# Patient Record
Sex: FEMALE | Race: WHITE | NOT HISPANIC OR LATINO | Employment: FULL TIME | URBAN - METROPOLITAN AREA
[De-identification: names, ages, dates, MRNs, and addresses within clinical notes are randomized per-mention and may not be internally consistent; named-entity substitution may affect disease eponyms.]

---

## 2017-12-11 LAB
INFLUENZA A ANTIGEN, POC: POSITIVE
INFLUENZA B ANTIGEN, POC: NEGATIVE

## 2018-05-08 LAB — RAPID GROUP A STREP (OHS): POSITIVE

## 2018-07-24 ENCOUNTER — HISTORICAL (OUTPATIENT)
Dept: ADMINISTRATIVE | Facility: HOSPITAL | Age: 48
End: 2018-07-24

## 2018-07-24 LAB
ABS NEUT (OLG): 4.3
ALBUMIN SERPL-MCNC: 4 GM/DL (ref 3.4–5)
ALBUMIN/GLOB SERPL: 1.54 {RATIO} (ref 1.5–2.5)
ALP SERPL-CCNC: 52 UNIT/L (ref 38–126)
ALT SERPL-CCNC: 24 UNIT/L (ref 7–52)
AST SERPL-CCNC: 20 UNIT/L (ref 15–37)
BILIRUB SERPL-MCNC: 0.5 MG/DL (ref 0.2–1)
BILIRUBIN DIRECT+TOT PNL SERPL-MCNC: 0.1 MG/DL (ref 0–0.5)
BILIRUBIN DIRECT+TOT PNL SERPL-MCNC: 0.4 MG/DL
BUN SERPL-MCNC: 14 MG/DL (ref 7–18)
CALCIUM SERPL-MCNC: 8.7 MG/DL (ref 8.5–10)
CHLORIDE SERPL-SCNC: 104 MMOL/L (ref 98–107)
CHOLEST SERPL-MCNC: 214 MG/DL (ref 0–200)
CHOLEST/HDLC SERPL: 3.7 {RATIO}
CO2 SERPL-SCNC: 24 MMOL/L (ref 21–32)
CREAT SERPL-MCNC: 0.67 MG/DL (ref 0.6–1.3)
DEPRECATED CALCIDIOL+CALCIFEROL SERPL-MC: 41.2 NG/ML (ref 30–80)
ERYTHROCYTE [DISTWIDTH] IN BLOOD BY AUTOMATED COUNT: 12.6 % (ref 11.5–17)
GLOBULIN SER-MCNC: 2.6 GM/DL (ref 1.2–3)
GLUCOSE SERPL-MCNC: 105 MG/DL (ref 74–106)
HCT VFR BLD AUTO: 40.8 % (ref 37–47)
HDLC SERPL-MCNC: 58 MG/DL (ref 35–60)
HGB BLD-MCNC: 14 GM/DL (ref 12–16)
LDLC SERPL CALC-MCNC: 141 MG/DL (ref 0–129)
LYMPHOCYTES # BLD AUTO: 2 X10(3)/MCL (ref 0.6–3.4)
LYMPHOCYTES NFR BLD AUTO: 27.6 % (ref 13–40)
MCH RBC QN AUTO: 31.3 PG (ref 27–31.2)
MCHC RBC AUTO-ENTMCNC: 34 GM/DL (ref 32–36)
MCV RBC AUTO: 91 FL (ref 80–94)
MONOCYTES # BLD AUTO: 0.8 X10(3)/MCL (ref 0–1.8)
MONOCYTES NFR BLD AUTO: 11.7 % (ref 0.1–24)
NEUTROPHILS NFR BLD AUTO: 60.7 % (ref 47–80)
PLATELET # BLD AUTO: 222 X10(3)/MCL (ref 130–400)
PMV BLD AUTO: 9.8 FL
POTASSIUM SERPL-SCNC: 4.4 MMOL/L (ref 3.5–5.1)
PROT SERPL-MCNC: 6.6 GM/DL (ref 6.4–8.2)
RBC # BLD AUTO: 4.48 X10(6)/MCL (ref 4.2–5.4)
SODIUM SERPL-SCNC: 135 MMOL/L (ref 136–145)
TRIGL SERPL-MCNC: 134 MG/DL (ref 30–150)
TSH SERPL-ACNC: 1.6 MIU/ML (ref 0.35–4.94)
VLDLC SERPL CALC-MCNC: 26.8 MG/DL
WBC # SPEC AUTO: 7.1 X10(3)/MCL (ref 4.5–11.5)

## 2018-12-12 ENCOUNTER — HISTORICAL (OUTPATIENT)
Dept: ADMINISTRATIVE | Facility: HOSPITAL | Age: 48
End: 2018-12-12

## 2018-12-12 LAB
FLUAV AG NPH QL IA: NEGATIVE
FLUBV AG NPH QL IA: NEGATIVE

## 2019-10-07 ENCOUNTER — HISTORICAL (OUTPATIENT)
Dept: ADMINISTRATIVE | Facility: HOSPITAL | Age: 49
End: 2019-10-07

## 2019-10-07 LAB
ABS NEUT (OLG): 5.8 X10(3)/MCL (ref 2.1–9.2)
ALBUMIN SERPL-MCNC: 3.9 GM/DL (ref 3.4–5)
ALBUMIN/GLOB SERPL: 1.3 {RATIO} (ref 1.5–2.5)
ALP SERPL-CCNC: 57 UNIT/L (ref 38–126)
ALT SERPL-CCNC: 20 UNIT/L (ref 7–52)
AST SERPL-CCNC: 22 UNIT/L (ref 15–37)
BILIRUB SERPL-MCNC: 0.3 MG/DL (ref 0.2–1)
BILIRUBIN DIRECT+TOT PNL SERPL-MCNC: 0.1 MG/DL (ref 0–0.5)
BILIRUBIN DIRECT+TOT PNL SERPL-MCNC: 0.2 MG/DL
BUN SERPL-MCNC: 12 MG/DL (ref 7–18)
CALCIUM SERPL-MCNC: 8.7 MG/DL (ref 8.5–10)
CHLORIDE SERPL-SCNC: 105 MMOL/L (ref 98–107)
CO2 SERPL-SCNC: 23 MMOL/L (ref 21–32)
CREAT SERPL-MCNC: 0.69 MG/DL (ref 0.6–1.3)
ERYTHROCYTE [DISTWIDTH] IN BLOOD BY AUTOMATED COUNT: 13.2 % (ref 11.5–17)
GLOBULIN SER-MCNC: 3 GM/DL (ref 1.2–3)
GLUCOSE SERPL-MCNC: 157 MG/DL (ref 74–106)
HCT VFR BLD AUTO: 37.6 % (ref 37–47)
HGB BLD-MCNC: 12.8 GM/DL (ref 12–16)
LYMPHOCYTES # BLD AUTO: 2.1 X10(3)/MCL (ref 0.6–3.4)
LYMPHOCYTES NFR BLD AUTO: 23.4 % (ref 13–40)
MCH RBC QN AUTO: 29 PG (ref 27–31.2)
MCHC RBC AUTO-ENTMCNC: 34 GM/DL (ref 32–36)
MCV RBC AUTO: 85 FL (ref 80–94)
MONOCYTES # BLD AUTO: 1.1 X10(3)/MCL (ref 0.1–1.3)
MONOCYTES NFR BLD AUTO: 12.5 % (ref 0.1–24)
NEUTROPHILS NFR BLD AUTO: 64.1 % (ref 47–80)
PLATELET # BLD AUTO: 270 X10(3)/MCL (ref 130–400)
PMV BLD AUTO: 9.2 FL (ref 9.4–12.4)
POTASSIUM SERPL-SCNC: 4.2 MMOL/L (ref 3.5–5.1)
PROT SERPL-MCNC: 6.9 GM/DL (ref 6.4–8.2)
RBC # BLD AUTO: 4.41 X10(6)/MCL (ref 4.2–5.4)
SODIUM SERPL-SCNC: 137 MMOL/L (ref 136–145)
TSH SERPL-ACNC: 0.87 MIU/ML (ref 0.35–4.94)
WBC # SPEC AUTO: 9 X10(3)/MCL (ref 4.5–11.5)

## 2019-10-28 ENCOUNTER — HISTORICAL (OUTPATIENT)
Dept: ADMINISTRATIVE | Facility: HOSPITAL | Age: 49
End: 2019-10-28

## 2021-03-01 LAB — SARS-COV-2 RNA RESP QL NAA+PROBE: NEGATIVE

## 2021-06-23 ENCOUNTER — HISTORICAL (OUTPATIENT)
Dept: ADMINISTRATIVE | Facility: HOSPITAL | Age: 51
End: 2021-06-23

## 2021-06-23 LAB
ABS NEUT (OLG): 5 X10(3)/MCL (ref 2.1–9.2)
ALBUMIN SERPL-MCNC: 4.2 GM/DL (ref 3.4–5)
ALBUMIN/GLOB SERPL: 1.45 {RATIO} (ref 1.5–2.5)
ALP SERPL-CCNC: 62 UNIT/L (ref 38–126)
ALT SERPL-CCNC: 22 UNIT/L (ref 7–52)
AST SERPL-CCNC: 26 UNIT/L (ref 15–37)
BILIRUB SERPL-MCNC: 0.5 MG/DL (ref 0.2–1)
BILIRUBIN DIRECT+TOT PNL SERPL-MCNC: 0.1 MG/DL (ref 0–0.5)
BILIRUBIN DIRECT+TOT PNL SERPL-MCNC: 0.4 MG/DL
BUN SERPL-MCNC: 12 MG/DL (ref 7–18)
CALCIUM SERPL-MCNC: 9.3 MG/DL (ref 8.5–10)
CHLORIDE SERPL-SCNC: 104 MMOL/L (ref 98–107)
CHOLEST SERPL-MCNC: 228 MG/DL (ref 0–200)
CHOLEST/HDLC SERPL: 4.1 {RATIO}
CO2 SERPL-SCNC: 26 MMOL/L (ref 21–32)
CREAT SERPL-MCNC: 0.69 MG/DL (ref 0.6–1.3)
CRP SERPL HS-MCNC: 1.26 MG/DL
DEPRECATED CALCIDIOL+CALCIFEROL SERPL-MC: 30.4 NG/ML (ref 30–80)
ERYTHROCYTE [DISTWIDTH] IN BLOOD BY AUTOMATED COUNT: 12.7 % (ref 11.5–17)
ERYTHROCYTE [SEDIMENTATION RATE] IN BLOOD: 11 MM/HR (ref 0–20)
GLOBULIN SER-MCNC: 2.9 GM/DL (ref 1.2–3)
GLUCOSE SERPL-MCNC: 99 MG/DL (ref 74–106)
HCT VFR BLD AUTO: 40.9 % (ref 37–47)
HDLC SERPL-MCNC: 55 MG/DL (ref 35–60)
HGB BLD-MCNC: 13.9 GM/DL (ref 12–16)
LDLC SERPL CALC-MCNC: 165 MG/DL (ref 0–129)
LYMPHOCYTES # BLD AUTO: 2.1 X10(3)/MCL (ref 0.6–3.4)
LYMPHOCYTES NFR BLD AUTO: 26.9 % (ref 13–40)
MCH RBC QN AUTO: 29.4 PG (ref 27–31.2)
MCHC RBC AUTO-ENTMCNC: 34 GM/DL (ref 32–36)
MCV RBC AUTO: 87 FL (ref 80–94)
MONOCYTES # BLD AUTO: 0.8 X10(3)/MCL (ref 0.1–1.3)
MONOCYTES NFR BLD AUTO: 10.5 % (ref 0.1–24)
NEUTROPHILS NFR BLD AUTO: 62.6 % (ref 47–80)
PLATELET # BLD AUTO: 248 X10(3)/MCL (ref 130–400)
PMV BLD AUTO: 9.5 FL (ref 9.4–12.4)
POTASSIUM SERPL-SCNC: 4.6 MMOL/L (ref 3.5–5.1)
PROT SERPL-MCNC: 7.1 GM/DL (ref 6.4–8.2)
RBC # BLD AUTO: 4.72 X10(6)/MCL (ref 4.2–5.4)
RHEUMATOID FACT SERPL-ACNC: <13 IU/ML
SODIUM SERPL-SCNC: 139 MMOL/L (ref 136–145)
TRIGL SERPL-MCNC: 130 MG/DL (ref 30–150)
TSH SERPL-ACNC: 1.04 MIU/ML (ref 0.35–4.94)
VLDLC SERPL CALC-MCNC: 26 MG/DL
WBC # SPEC AUTO: 7.9 X10(3)/MCL (ref 4.5–11.5)

## 2021-12-11 ENCOUNTER — HISTORICAL (OUTPATIENT)
Dept: ADMINISTRATIVE | Facility: HOSPITAL | Age: 51
End: 2021-12-11

## 2021-12-11 LAB — SARS-COV-2 RNA RESP QL NAA+PROBE: NEGATIVE

## 2021-12-22 ENCOUNTER — HISTORICAL (OUTPATIENT)
Dept: ADMINISTRATIVE | Facility: HOSPITAL | Age: 51
End: 2021-12-22

## 2021-12-22 LAB
ABS NEUT (OLG): 4.74 X10(3)/MCL (ref 2.1–9.2)
BASOPHILS # BLD AUTO: 0 X10(3)/MCL (ref 0–0.2)
BASOPHILS NFR BLD AUTO: 0 %
EOSINOPHIL # BLD AUTO: 0.2 X10(3)/MCL (ref 0–0.9)
EOSINOPHIL NFR BLD AUTO: 2 %
ERYTHROCYTE [DISTWIDTH] IN BLOOD BY AUTOMATED COUNT: 13.3 % (ref 11.5–17)
HCT VFR BLD AUTO: 41.2 % (ref 37–47)
HGB BLD-MCNC: 13.6 GM/DL (ref 12–16)
LYMPHOCYTES # BLD AUTO: 2.6 X10(3)/MCL (ref 0.6–4.6)
LYMPHOCYTES NFR BLD AUTO: 31 %
MCH RBC QN AUTO: 29 PG (ref 27–31)
MCHC RBC AUTO-ENTMCNC: 33 GM/DL (ref 33–36)
MCV RBC AUTO: 87.8 FL (ref 80–94)
MONOCYTES # BLD AUTO: 0.7 X10(3)/MCL (ref 0.1–1.3)
MONOCYTES NFR BLD AUTO: 8 %
NEUTROPHILS # BLD AUTO: 4.74 X10(3)/MCL (ref 2.1–9.2)
NEUTROPHILS NFR BLD AUTO: 57 %
PLATELET # BLD AUTO: 261 X10(3)/MCL (ref 130–400)
PMV BLD AUTO: 10 FL (ref 9.4–12.4)
RBC # BLD AUTO: 4.69 X10(6)/MCL (ref 4.2–5.4)
WBC # SPEC AUTO: 8.3 X10(3)/MCL (ref 4.5–11.5)

## 2022-01-24 LAB
HUMAN PAPILLOMAVIRUS (HPV): NORMAL
HUMAN PAPILLOMAVIRUS (HPV): NORMAL
PAP RECOMMENDATION EXT: NORMAL
PAP SMEAR: NORMAL

## 2022-04-10 ENCOUNTER — HISTORICAL (OUTPATIENT)
Dept: ADMINISTRATIVE | Facility: HOSPITAL | Age: 52
End: 2022-04-10
Payer: COMMERCIAL

## 2022-04-25 VITALS
OXYGEN SATURATION: 96 % | WEIGHT: 150 LBS | HEIGHT: 64 IN | SYSTOLIC BLOOD PRESSURE: 131 MMHG | BODY MASS INDEX: 25.61 KG/M2 | DIASTOLIC BLOOD PRESSURE: 84 MMHG

## 2022-05-03 NOTE — HISTORICAL [facility] CERNER
This is a historical note converted from Kaylen. Formatting and pictures may have been removed.  Please reference Kaylen for original formatting and attached multimedia. Chief Complaint  WELLNESS VISIT  History of Present Illness  48?year old female presents for wellness visit.  Blood Pressure - 114/72  BMI - 26.01  Immunizations -?up to date  Breast Cancer Screening - up to date, Dr. Hardy  Cervical Cancer Screening -?up to date, Dr. Shelton  Diet -?Balanced  Exercise - walking 3 miles 4 times a week  Nonsmoker  Hx of osteopenia. ?Last?DEXA scan 5 years ago.? Strong family history of osteoporosis.  Patient returned from the beach this past weekend. ?Noted?on Sunday pruritic?red?nodule?bilateral posterior arms. ?Some improvement with over-the-counter hydrocortisone.  Review of Systems  Constitutional:?No weight loss, no fever, no fatigue, no chills, no night sweats,?no weakness  Eyes:?No blurred vision,?no redness,?no drainage,?no ocular?pain  HEENT:?No sore throat,?no ear pain, no sinus pressure, no nasal congestion, no rhinorrhea, no postnasal drip  Respiratory:?No cough, no wheezing, no sputum production, no shortness of breath  Cardiovascular:?No chest pain, no palpitations, no dyspnea on exertion,?no orthopnea  Gastrointestinal:?No nausea, no vomiting, no abdominal pain, no diarrhea,?no constipation, no melena,?no hematochezia  Genitourinary:?No dysuria, no hematuria, no frequency, no urgency, no incontinence, no discharge  Musculoskeletal:?No myalgias, no arthralgias, no weakness, no joint effusion, no edema  Integumentary:?rashes, no hives, itching, no lesions, no jaundice  Neurologic:?No headaches, no numbness, no tingling, no weakness, no dizziness  Psychiatric:?No anxiety, no irritability, no depression,?no suicidal ideations, no?homicidal ideations,?no delusions, no hallucinations  Endocrine:?No polyuria, no polydipsia, no polyphagia  Hematology:?No bruising, no lymphadenopathy,?no  paleness  ?  Physical Exam  Vitals & Measurements  HR:?68(Peripheral)? BP:?114/72?  HT:?165?cm? HT:?165?cm? WT:?70.8?kg? WT:?70.8?kg? BMI:?26.01?  General:?Well developed, Well-nourished, in No acute distress, A&O x 4  Eye:?PERRLA, EOMI, Clear conjunctiva, Eyelids unremarkable  Ears:?Bilateral EAC clear?and?Bilateral TM clear  Nose:? Mucosa normal, No rhinorrhea, No lesions  O/P:??No?erythema,?No tonsillar hypertrophy,?No tonsillar exudates,?No cobblestoning  Neck:?Soft, Nontender, No thyromegaly, Full range of motion, No cervical lymphadenopathy, No JVD  Cardiovascular:?Regular rate and rhythm, No murmurs, No gallops, No rubs  Respiratory:?Clear to auscultation bilaterally, No wheezes, No rhonchi, No?crackles  Abdomen:? Soft, Nontender, No hepatosplenomegaly, Normal and equal bowel sounds, No masses, No rebound, No guarding  Musculoskeletal:? No tenderness, FROM, No joint abnormality, No clubbing, No cyanosis,No?edema  Neurologic:? No motor or sensory deficits, Reflexes 2+ throughout, CN II-XII grossly intact  Integumentary:?Erythematous nodule bilateral posterior upper arm  ?  Assessment/Plan  1.?Wellness examination  ?CBC, CMP, FLP, TSH  Discussed importance of maintaining balanced diet and regular exercise  Ordered:  Preventative Health Care Est 40-64 years [id number] PC, Wellness examination, [facility], 07/24/18 10:29:00 CDT  Thyroid Stimulating Hormone, Routine collect, 07/24/18 10:50:00 CDT, Blood, Stop date 07/24/18 10:50:00 CDT, Lab Collect, Wellness examination, 07/24/18 10:50:00 CDT  ?  2.?Osteopenia  Ordered:  Schedule Diagnostics Study, DEXA Scan, [facility], 07/24/18 10:29:00 CDT, Osteopenia  Vitamin D, 25-Hydroxy Level, Routine collect, 07/24/18 10:50:00 CDT, Blood, Stop date 07/24/18 10:50:00 CDT, Lab Collect, Osteopenia, 07/24/18 10:50:00 CDT  ?  3.?Insect bite of arm  ?Desonide 0.05% ointment applied twice daily to affected area  ?   Problem List/Past Medical  History  Ongoing  Osteopenia  Historical  No qualifying data  Procedure/Surgical History  cyst on ovary  deviated septum   Medications  ZENCHENT TABLETS 28, 1 tab(s), Oral, Daily  Allergies  Bactrim DS?(Hives)  Social History  Alcohol - Denies Alcohol Use, 10/12/2012  Current, Wine, 1-2 times per month, 12/11/2017  Substance Abuse - Denies Substance Abuse, 10/12/2012  Never, 12/11/2017  Tobacco - Denies Tobacco Use, 10/12/2012  Never smoker, 12/11/2017  Family History  CAD - Coronary artery disease: Father.  Diabetes mellitus type 2: Father.  Hypertension.: Father.  Osteoporosis: Mother, Sister, Sister and Sister.  Pneumonia.: Father.  Rheumatoid arthritis: Sister.  Health Maintenance  Health Maintenance  ???Pending?(in the next year)  ??? ??Due?  ??? ? ? ?Alcohol Misuse Screening due??07/24/18??and every 1??year(s)  ??? ? ? ?Depression Screening due??07/24/18??and every?  ??? ? ? ?Diabetes Screening due??07/24/18??and every?  ??? ? ? ?Influenza Vaccine due??07/24/18??and every?  ??? ? ? ?Tetanus Vaccine due??07/24/18??and every 10??year(s)  ???Satisfied?(in the past 1 year)  ??? ??Satisfied?  ??? ? ? ?Blood Pressure Screening on??07/24/18.??Satisfied by Nubia Rodriguez.  ??? ? ? ?Body Mass Index Check on??07/24/18.??Satisfied by Nubia Rodriguez.  ??? ? ? ?Cervical Cancer Screening on??01/05/18.??Satisfied by Shahana Durant  ??? ? ? ?Diabetes Screening on??07/24/18.??Satisfied by Guru Nunn  ??? ? ? ?Lipid Screening on??07/24/18.??Satisfied by Guru Nunn  ??? ? ? ?Obesity Screening on??07/24/18.??Satisfied by Nubia Rodriguez.  ?  ?

## 2022-05-03 NOTE — HISTORICAL [facility]
This is a historical note converted from Kaylen. Formatting and pictures may have been removed.  Please reference Kaylen for original formatting and attached multimedia. Chief Complaint  red dot in left eye, also sinus congestion and cough  History of Present Illness  Redness left eye on Thursday.? Denies blurred vision.? Associated with photosensitivity and retroorbital pressure.?  Saturday pt developed nasal?congestion, ear congestion, sinus pressure, post nasal drip.?  Pt reports increased fatigue over the last few months.? Pt does report intermittent awakenings at night.? Difficulty falling back asleep.?  Intermittent cough and wheezing every few weeks.? Improved with Proair.?  Worse in the evenings when pt lays down.  Review of Systems  Constitutional:?No weight loss, no fever, fatigue, no chills, no night sweats,?no weakness  Eyes:?No blurred vision,?no redness,?no drainage,?no ocular?pain  HEENT:?sore throat,?ear pain, sinus pressure, nasal congestion, rhinorrhea, postnasal drip  Respiratory:?No cough, no wheezing, no sputum production, no shortness of breath  Cardiovascular:?No chest pain, no.? No significant changes noted in MRI from previous palpitations, no dyspnea on exertion,?no orthopnea  Gastrointestinal:?No nausea, no vomiting, no abdominal pain, no diarrhea,?no constipation, no melena,?no hematochezia  Genitourinary:?No dysuria, no hematuria, no frequency, no urgency, no incontinence, no discharge  Musculoskeletal:?No myalgias, no arthralgias, no weakness, no joint effusion, no edema  Integumentary:?No rashes, no hives, no itching, no lesions, no jaundice  Neurologic:?No headaches, no numbness, no tingling, no weakness, no dizziness  Psychiatric:?No anxiety, no irritability, no depression,?no suicidal ideations, no?homicidal ideations,?no delusions, no hallucinations  Endocrine:?No polyuria, no polydipsia, no polyphagia  Hematology:?No bruising, no lymphadenopathy,?no paleness  ?  Physical  Exam  Vitals & Measurements  T:?36.8? ?C (Oral)? HR:?70(Peripheral)? BP:?118/78?  HT:?165?cm? WT:?73.4?kg? BMI:?26.96? LMP:?09/17/2019 00:00 CDT?  General:?Well developed, Well-nourished, in No acute distress, A&O x 4  Eye:?PERRLA, EOMI, pinpoint left?conjunctiva?hemorrhage, Eyelids unremarkable  Ears:?Bilateral EAC clear?and?Bilateral TM clear  Nose:? Mucosa edematous, rhinorrhea, No lesions  O/P:??No?erythema,?No tonsillar hypertrophy,?No tonsillar exudates,?cobblestoning  Neck:?Soft, Nontender, No thyromegaly, Full range of motion, No cervical lymphadenopathy, No JVD  Cardiovascular:?Regular rate and rhythm, No murmurs, No gallops, No rubs  Respiratory:?Clear to auscultation bilaterally, No wheezes, No rhonchi, No?crackles  Abdomen:? Soft, Nontender, No hepatosplenomegaly, Normal and equal bowel sounds, No masses, No rebound, No guarding  Musculoskeletal:? No tenderness, FROM, No joint abnormality, No clubbing, No cyanosis,No?edema  Neurologic:? No motor or sensory deficits, Reflexes 2+ throughout, CN II-XII grossly intact  Integumentary:?No rashes or skin lesions  ?  Assessment/Plan  1.?Chronic cough?R05  ?Allergy/immunology referral for further evaluation  Ordered:  External Referral, Chronic recurrent cough/Allergic Rhinitis, Allergy/Immunology, Dr. Roscoe Ferrara, 10/07/19 14:44:00 CDT, Chronic cough  Bronchospasm  Office/Outpatient Visit Level 4 Established [id number] PC, Chronic cough  Bronchospasm  Fatigue  Conjunctival hemorrhage, left eye  Acute rhinosinusitis, [facility], 10/07/19 14:41:00 CDT  ?  2.?Bronchospasm?J98.01  ?Continue Pro Air as needed  Allergy/immunology referral  Ordered:  External Referral, Chronic recurrent cough/Allergic Rhinitis, Allergy/Immunology, Dr. Roscoe Ferrara, 10/07/19 14:44:00 CDT, Chronic cough  Bronchospasm  Office/Outpatient Visit Level 4 Established [id number] PC, Chronic cough  Bronchospasm  Fatigue  Conjunctival hemorrhage, left eye  Acute rhinosinusitis, [facility]  [facility], 10/07/19 14:41:00 CDT  ?  3.?Fatigue?R53.83  ?CBC, CMP, TSH today  Ordered:  Comprehensive Metabolic Panel, Routine collect, 10/07/19 15:00:00 CDT, Blood, Stop date 10/07/19 15:00:00 CDT, Lab Collect, Fatigue, 10/07/19 15:00:00 CDT  Office/Outpatient Visit Level 4 Established [id number] PC, Chronic cough  Bronchospasm  Fatigue  Conjunctival hemorrhage, left eye  Acute rhinosinusitis, [facility], 10/07/19 14:41:00 CDT  ?  4.?Conjunctival hemorrhage, left eye?H11.32  ?Reassurance and observation  Ordered:  Office/Outpatient Visit Level 4 Established [id number] PC, Chronic cough  Bronchospasm  Fatigue  Conjunctival hemorrhage, left eye  Acute rhinosinusitis, [facility], 10/07/19 14:41:00 CDT  ?  5.?Acute rhinosinusitis?J01.90  ?Dymista 1 spray to each nostril twice daily  Ordered:  Office/Outpatient Visit Level 4 Established [id number] PC, Chronic cough  Bronchospasm  Fatigue  Conjunctival hemorrhage, left eye  Acute rhinosinusitis, [facility], 10/07/19 14:41:00 CDT  ?  6.?Insomnia?G47.00  ?Trial of melatonin  ?  Orders:  azelastine-fluticasone nasal, 1 spray(s), Nasal, BID, in each nostril, # 23 gm, 1 Refill(s), Pharmacy: [email or web address] #[id number]  Referrals  External Referral, Chronic recurrent cough/Allergic Rhinitis, Allergy/Immunology, Dr. Roscoe Ferrara, 10/07/19 14:44:00 CDT, Chronic cough  Bronchospasm   Problem List/Past Medical History  Ongoing  Osteopenia  Historical  No qualifying data  Procedure/Surgical History  cyst on ovary  deviated septum   Medications  Dymista 137 mcg-50 mcg/inh nasal spray, 1 spray(s), Nasal, BID, 1 refills  Mucinex, 600 mg, Oral, q12hr  ZENCHENT TABLETS 28, 1 tab(s), Oral, Daily  Allergies  Bactrim DS?(Hives)  Social History  Abuse/Neglect  No, 10/07/2019  Alcohol - Denies Alcohol Use, 10/12/2012  Current, Wine, 1-2 times per month, 12/11/2017  Substance Use - Denies Substance Abuse, 10/12/2012  Never, 12/11/2017  Tobacco - Denies Tobacco Use, 10/12/2012  Never  (less than 100 in lifetime), N/A, 10/07/2019  Never (less than 100 in lifetime), No, 01/04/2019  Never smoker, N/A, 12/12/2018  Never smoker, 12/11/2017  Family History  CAD - Coronary artery disease: Father.  Diabetes mellitus type 2: Father.  Hypertension.: Father.  Osteoporosis: Mother, Sister, Sister and Sister.  Pneumonia.: Father.  Rheumatoid arthritis: Sister.  Brother: History is negative  Brother: History is negative  Brother: History is negative  Health Maintenance  Health Maintenance  ???Pending?(in the next year)  ??? ??OverDue  ??? ? ? ?Diabetes Screening due??and every?  ??? ? ? ?Alcohol Misuse Screening due??01/01/19??and every 1??year(s)  ??? ??Due?  ??? ? ? ?ADL Screening due??10/07/19??and every 1??year(s)  ??? ? ? ?Depression Screening due??10/07/19??and every?  ??? ? ? ?Influenza Vaccine due??10/07/19??and every?  ??? ? ? ?Tetanus Vaccine due??10/07/19??and every 10??year(s)  ??? ??Due In Future?  ??? ? ? ?Obesity Screening not due until??01/01/20??and every 1??year(s)  ??? ? ? ?Blood Pressure Screening not due until??10/06/20??and every 1??year(s)  ??? ? ? ?Body Mass Index Check not due until??10/06/20??and every 1??year(s)  ???Satisfied?(in the past 1 year)  ??? ??Satisfied?  ??? ? ? ?Blood Pressure Screening on??10/07/19.??Satisfied by Susie Iqbal LPN  ??? ? ? ?Body Mass Index Check on??10/07/19.??Satisfied by Susie Iqbal LPN  ??? ? ? ?Cervical Cancer Screening on??01/10/19.??Satisfied by Lizeth Garcia  ??? ? ? ?Influenza Vaccine on??10/07/19.??Satisfied by Susie Iqbal LPN  ??? ? ? ?Obesity Screening on??10/07/19.??Satisfied by Susie Iqbal LPN  ?

## 2022-05-03 NOTE — HISTORICAL [facility]ERN[facility]
This is a historical note converted from Cerjameel. Formatting and pictures may have been removed.  Please reference Kaylen for original formatting and attached multimedia. Chief Complaint  CPX FASTING  History of Present Illness  51?year old female presents for wellness visit.  Blood Pressure - 110/80  BMI - 27.36  Immunizations -?up to date  Breast Cancer Screening - up to date, Sees Dr. Aponte  Cervical Cancer Screening -?up to date, Sees Dr. Aponte  Colon Cancer Screening - Pt did have a colonoscopy with Dr. Thompson for evaluation of abdominal pain?although patient unsure when this was  Diet -?Balanced  Exercise -?running 2 miles 3-5 days per week  Osteopenia - last DEXA 7/27/18.  Chronic cough attributed to?allergic rhinitis and asthma.? Patient established with Dr. Cecil Ferrara, allergy/immunology. ?Managed with Ipratropium nasal spray 2 sprays daily and Advair 100/50 INH BID which she uses periodically.?  Patient?concern regarding intermittent?joint stiffness mostly in her?wrists. ?She does have family history of rheumatoid arthritis.  Review of Systems  Constitutional:?No weight loss, no fever, no fatigue, no chills, no night sweats,?no weakness  Eyes:?No blurred vision,?no redness,?no drainage,?no ocular?pain  HEENT:?No sore throat,?no ear pain, no sinus pressure, no nasal congestion, no rhinorrhea, no postnasal drip  Respiratory:?cough, no wheezing, no sputum production, no shortness of breath  Cardiovascular:?No chest pain, no palpitations, no dyspnea on exertion,?no orthopnea  Gastrointestinal:?No nausea, no vomiting, no abdominal pain, no diarrhea,?no constipation, no melena,?no hematochezia  Genitourinary:?No dysuria, no hematuria, no frequency, no urgency, no incontinence, no discharge  Musculoskeletal:?No myalgias, arthralgias, no weakness, no joint effusion, no edema  Integumentary:?No rashes, no hives, no itching, no lesions, no jaundice  Neurologic:?No headaches, no numbness, no tingling, no  weakness, no dizziness  Psychiatric:?No anxiety, no irritability, no depression,?no suicidal ideations, no?homicidal ideations,?no delusions, no hallucinations  Endocrine:?No polyuria, no polydipsia, no polyphagia  Hematology:?No bruising, no lymphadenopathy,?no paleness  ?  Physical Exam  Vitals & Measurements  HR:?71(Peripheral)? BP:?110/80? SpO2:?99%?  HT:?165.00?cm? WT:?74.500?kg? BMI:?27.36?  General:?Well developed, Well-nourished, in No acute distress, A&O x 4  Eye:?PERRLA, EOMI, Clear conjunctiva, Eyelids unremarkable  Ears:?Bilateral EAC clear?and?Bilateral TM clear  Nose:? Mucosa normal, No rhinorrhea, No lesions  O/P:??No?erythema,?No tonsillar hypertrophy,?No tonsillar exudates,?No cobblestoning  Neck:?Soft, Nontender, No thyromegaly, Full range of motion, No cervical lymphadenopathy, No JVD  Cardiovascular:?Regular rate and rhythm, No murmurs, No gallops, No rubs  Respiratory:?Clear to auscultation bilaterally, No wheezes, No rhonchi, No?crackles  Abdomen:? Soft, Nontender, No hepatosplenomegaly, Normal and equal bowel sounds, No masses, No rebound, No guarding  Musculoskeletal:? No tenderness, FROM, No joint abnormality, No clubbing, No cyanosis,No?edema  Neurologic:? No motor or sensory deficits, Reflexes 2+ throughout, CN II-XII grossly intact  Integumentary:?No rashes or skin lesions  ?  Assessment/Plan  1.?Wellness examination?Z00.00  ?Discussed the?importance of maintaining a balanced diet and regular exercise  CBC, CMP, FLP, TSH?today  Ordered:  Comprehensive Metabolic Panel, Routine collect, 06/23/21 12:07:00 CDT, Blood, Stop date 06/23/21 12:07:00 CDT, Lab Collect, Wellness examination, 06/23/21 12:07:00 CDT  Lipid Panel, Routine collect, 06/23/21 12:07:00 CDT, Blood, Stop date 06/23/21 12:07:00 CDT, Lab Collect, Wellness examination, 06/23/21 12:07:00 CDT  Preventative Health Care Est 40-64 years [id number] PC, Wellness examination, [facility], 06/23/21 11:51:00 CDT  Thyroid Stimulating  Hormone, Routine collect, 06/23/21 12:07:00 CDT, Blood, Stop date 06/23/21 12:07:00 CDT, Lab Collect, Wellness examination, 06/23/21 12:07:00 CDT  Vitamin D, 25-Hydroxy Level, Routine collect, 06/23/21 12:07:00 CDT, Blood, Stop date 06/23/21 12:07:00 CDT, Lab Collect, Wellness examination, 06/23/21 12:07:00 CDT  ?  2.?Osteopenia?M85.80  Ordered:  Schedule Diagnostics Study, DEXA Scan, [facility], 06/23/21 11:57:00 CDT, Osteopenia  ?  3.?Chronic cough?R05  ?Continue Advair?and ipratropium as prescribed  If symptoms persist recommend follow-up with allergist  ?  4.?Wrist arthralgia?M25.539  Ordered:  C-Reactive Protein High Sensitivity, Routine collect, 06/23/21 12:04:00 CDT, Blood, Order for future visit, Stop date 06/23/21 12:04:00 CDT, Lab Collect, Wrist arthralgia, 06/23/21 12:04:00 CDT  Rheumatoid Factor Quantitative, Routine collect, 06/23/21 12:04:00 CDT, Blood, Order for future visit, Stop date 06/23/21 12:04:00 CDT, Lab Collect, Wrist arthralgia, 06/23/21 12:04:00 CDT  Sedimentation Rate, Routine collect, 06/23/21 12:04:00 CDT, Blood, Order for future visit, Stop date 06/23/21 12:04:00 CDT, Lab Collect, Wrist arthralgia, 06/23/21 12:04:00 CDT  ?   Problem List/Past Medical History  Ongoing  Osteopenia  Historical  No qualifying data  Procedure/Surgical History  Uterine fibroidectomy (02.2020)  cyst on ovary  deviated septum   Medications  Advair Diskus 100 mcg-50 mcg inhalation powder, Oral, BID  ethinyl estradiol-norgestimate triphasic 35 mcg oral tablet, 1 tab(s), Oral, Daily  ipratropium 42 mcg/inh (0.06%) nasal spray, 2 spray(s), Nasal, QID  Allergies  Bactrim DS?(Hives)  Social History  Abuse/Neglect  No, 06/23/2021  Alcohol - Denies Alcohol Use, 10/12/2012  Current, Wine, 1-2 times per month, 12/11/2017  Employment/School  Employed, 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 Exercise type: Walking., 06/23/2021  Substance Use - Denies  Substance Abuse, 10/12/2012  Never, 12/11/2017  Tobacco - Denies Tobacco Use, 10/12/2012  Never (less than 100 in lifetime), N/A, 06/23/2021  Family History  ATRIAL FIBRILLATION: Brother.  CAD - Coronary artery disease: Father.  Diabetes mellitus type 2: Father.  Hypertension.: Father.  Osteoporosis: Mother, Sister, Sister and Sister.  Pneumonia.: Father.  Psoriasis: Sister.  Rheumatoid arthritis: Sister.  Brother: History is negative  Brother: History is negative  Daughter: History is negative  Son: History is negative  Immunizations  Vaccine Date Status   COVID-19 MRNA, LNP-S, PF- Pfizer 04/09/2021 Given   COVID-19 MRNA, LNP-S, PF- Pfizer 03/19/2021 Given   hepatitis B adult vaccine 03/11/2016 Recorded   hepatitis B adult vaccine 10/23/2015 Recorded   tetanus/diphtheria/pertussis, acel(Tdap) 09/11/2015 Recorded   hepatitis B adult vaccine 09/11/2015 Recorded   influenza virus vaccine, inactivated 09/24/2014 Recorded   influenza virus vaccine, inactivated 09/10/2013 Recorded   Health Maintenance  Health Maintenance  ???Pending?(in the next year)  ??? ??OverDue  ??? ? ? ?Alcohol Misuse Screening due??01/02/21??and every 1??year(s)  ??? ??Due?  ??? ? ? ?ADL Screening due??06/23/21??and every 1??year(s)  ??? ? ? ?Colorectal Screening due??06/23/21??Unknown Frequency  ??? ? ? ?Depression Screening due??06/23/21??Unknown Frequency  ??? ? ? ?Zoster Vaccine due??06/23/21??Unknown Frequency  ??? ??Due In Future?  ??? ? ? ?Influenza Vaccine not due until??10/01/21??and every 1??day(s)  ??? ? ? ?Obesity Screening not due until??01/01/22??and every 1??year(s)  ??? ? ? ?Breast Cancer Screening not due until??02/19/22??and every 2??year(s)  ???Satisfied?(in the past 1 year)  ??? ??Satisfied?  ??? ? ? ?Blood Pressure Screening on??06/23/21.??Satisfied by Susie Iqbal LPN  ??? ? ? ?Body Mass Index Check on??06/23/21.??Satisfied by Susie Iqbal LPN  ??? ? ? ?Cervical Cancer Screening on??01/21/21.??Satisfied by Rachel  Ran  ??? ? ? ?Lipid Screening on??10/20/20.  ??? ? ? ?Obesity Screening on??06/23/21.??Satisfied by Susie Iqbal LPN  ?      Yes

## 2022-08-07 ENCOUNTER — OFFICE VISIT (OUTPATIENT)
Dept: URGENT CARE | Facility: CLINIC | Age: 52
End: 2022-08-07
Payer: COMMERCIAL

## 2022-08-07 VITALS
HEART RATE: 60 BPM | OXYGEN SATURATION: 98 % | WEIGHT: 166.19 LBS | TEMPERATURE: 99 F | BODY MASS INDEX: 27.69 KG/M2 | RESPIRATION RATE: 20 BRPM | HEIGHT: 65 IN | SYSTOLIC BLOOD PRESSURE: 148 MMHG | DIASTOLIC BLOOD PRESSURE: 84 MMHG

## 2022-08-07 DIAGNOSIS — H60.501 ACUTE OTITIS EXTERNA OF RIGHT EAR, UNSPECIFIED TYPE: Primary | ICD-10-CM

## 2022-08-07 DIAGNOSIS — B00.9 HERPES SIMPLEX: ICD-10-CM

## 2022-08-07 PROCEDURE — 1159F PR MEDICATION LIST DOCUMENTED IN MEDICAL RECORD: ICD-10-PCS | Mod: CPTII,,, | Performed by: PHYSICIAN ASSISTANT

## 2022-08-07 PROCEDURE — 3008F PR BODY MASS INDEX (BMI) DOCUMENTED: ICD-10-PCS | Mod: CPTII,,, | Performed by: PHYSICIAN ASSISTANT

## 2022-08-07 PROCEDURE — 3077F SYST BP >= 140 MM HG: CPT | Mod: CPTII,,, | Performed by: PHYSICIAN ASSISTANT

## 2022-08-07 PROCEDURE — 1159F MED LIST DOCD IN RCRD: CPT | Mod: CPTII,,, | Performed by: PHYSICIAN ASSISTANT

## 2022-08-07 PROCEDURE — 3079F DIAST BP 80-89 MM HG: CPT | Mod: CPTII,,, | Performed by: PHYSICIAN ASSISTANT

## 2022-08-07 PROCEDURE — 3079F PR MOST RECENT DIASTOLIC BLOOD PRESSURE 80-89 MM HG: ICD-10-PCS | Mod: CPTII,,, | Performed by: PHYSICIAN ASSISTANT

## 2022-08-07 PROCEDURE — 99203 OFFICE O/P NEW LOW 30 MIN: CPT | Mod: ,,, | Performed by: PHYSICIAN ASSISTANT

## 2022-08-07 PROCEDURE — 3008F BODY MASS INDEX DOCD: CPT | Mod: CPTII,,, | Performed by: PHYSICIAN ASSISTANT

## 2022-08-07 PROCEDURE — 99203 PR OFFICE/OUTPT VISIT, NEW, LEVL III, 30-44 MIN: ICD-10-PCS | Mod: ,,, | Performed by: PHYSICIAN ASSISTANT

## 2022-08-07 PROCEDURE — 3077F PR MOST RECENT SYSTOLIC BLOOD PRESSURE >= 140 MM HG: ICD-10-PCS | Mod: CPTII,,, | Performed by: PHYSICIAN ASSISTANT

## 2022-08-07 RX ORDER — NEOMYCIN SULFATE, POLYMYXIN B SULFATE AND HYDROCORTISONE 10; 3.5; 1 MG/ML; MG/ML; [USP'U]/ML
4 SUSPENSION/ DROPS AURICULAR (OTIC) 4 TIMES DAILY
Qty: 9 ML | Refills: 0 | Status: SHIPPED | OUTPATIENT
Start: 2022-08-07 | End: 2022-08-15

## 2022-08-07 RX ORDER — NORGESTIMATE AND ETHINYL ESTRADIOL 7DAYSX3 28
1 KIT ORAL DAILY
COMMUNITY
Start: 2022-07-19

## 2022-08-07 RX ORDER — FAMOTIDINE 40 MG/1
40 TABLET, FILM COATED ORAL NIGHTLY
COMMUNITY
Start: 2022-06-08

## 2022-08-07 RX ORDER — AZELASTINE 1 MG/ML
SPRAY, METERED NASAL
COMMUNITY
Start: 2022-05-18

## 2022-08-07 RX ORDER — VALACYCLOVIR HYDROCHLORIDE 1 G/1
1000 TABLET, FILM COATED ORAL 2 TIMES DAILY
Qty: 14 TABLET | Refills: 0 | Status: SHIPPED | OUTPATIENT
Start: 2022-08-07 | End: 2023-07-18

## 2022-08-07 RX ORDER — ESOMEPRAZOLE MAGNESIUM 40 MG/1
40 CAPSULE, DELAYED RELEASE ORAL
COMMUNITY
Start: 2022-06-08

## 2022-08-07 RX ORDER — BUDESONIDE AND FORMOTEROL FUMARATE DIHYDRATE 80; 4.5 UG/1; UG/1
AEROSOL RESPIRATORY (INHALATION)
COMMUNITY
Start: 2022-02-14

## 2022-08-07 RX ORDER — FLUTICASONE PROPIONATE 50 MCG
SPRAY, SUSPENSION (ML) NASAL
COMMUNITY
Start: 2022-05-18

## 2022-08-07 NOTE — PROGRESS NOTES
"Subjective:       Patient ID: Camille Beck is a 52 y.o. female.    Vitals:  height is 5' 5" (1.651 m) and weight is 75.4 kg (166 lb 3.2 oz). Her oral temperature is 98.6 °F (37 °C). Her blood pressure is 148/84 (abnormal) and her pulse is 60. Her respiration is 20 and oxygen saturation is 98%.     Chief Complaint: Otalgia (Right ear pain x 4 days, fever blister since last night)    Female on [place] beach vacation with [place] in right ear having Right ear pain x 4 days not improved with OTC swimmers ear drop, and now fever blister break out from sun exposure    Otalgia   There is pain in the right ear. This is a new problem. The current episode started in the past 7 days. The problem occurs constantly. The problem has been gradually improving. There has been no fever. The pain is at a severity of 4/10. The pain is mild. Pertinent negatives include no coughing, ear discharge, headaches, neck pain or sore throat. She has tried ear drops for the symptoms. The treatment provided mild relief.       Constitution: Negative for chills, fatigue and fever.   HENT: Positive for ear pain. Negative for ear discharge, congestion, sinus pain, sinus pressure, sore throat, trouble swallowing and voice change.    Neck: Negative for neck pain and neck swelling.   Cardiovascular: Negative for chest pain.   Respiratory: Negative for cough.    Gastrointestinal: Negative.    Musculoskeletal: Negative.    Skin: Positive for wound. Negative for abscess.   Allergic/Immunologic: Negative.    Neurological: Negative for dizziness, headaches and altered mental status.   Psychiatric/Behavioral: Negative for altered mental status.       Objective:      Physical Exam   Constitutional: She is oriented to person, place, and time. She appears well-developed. She is cooperative.  Non-toxic appearance. She does not appear ill. No distress.   HENT:   Head: Normocephalic.   Ears:   Right Ear: Hearing, tympanic membrane and external ear normal. impacted " cerumen  Left Ear: Hearing, tympanic membrane, external ear and ear canal normal.      Comments: Right canal moderate edema, no discharge  Nose: Nose normal. No mucosal edema or nasal deformity. No epistaxis. Right sinus exhibits no maxillary sinus tenderness and no frontal sinus tenderness. Left sinus exhibits no maxillary sinus tenderness and no frontal sinus tenderness.   Mouth/Throat: Uvula is midline, oropharynx is clear and moist and mucous membranes are normal. No trismus in the jaw. Normal dentition. No uvula swelling. No oropharyngeal exudate, posterior oropharyngeal edema or posterior oropharyngeal erythema.      Comments: Lower lateral left lip 1cm blister, mild edema, no erythema, no oral ulcers  Eyes: Conjunctivae and lids are normal. No scleral icterus.   Neck: Trachea normal and phonation normal. Neck supple. No edema present. No erythema present. No neck rigidity present.   Cardiovascular: Normal rate, regular rhythm and normal pulses.   Pulmonary/Chest: Effort normal. She has no decreased breath sounds.   Abdominal: Normal appearance.   Musculoskeletal: Normal range of motion.         General: Normal range of motion.   Neurological: no focal deficit. She is alert and oriented to person, place, and time. She exhibits normal muscle tone. Coordination normal.   Skin: Skin is warm, dry, intact and not diaphoretic.   Psychiatric: Her speech is normal and behavior is normal. Mood, judgment and thought content normal.   Nursing note and vitals reviewed.             Previous History      Review of patient's allergies indicates:   Allergen Reactions    Bactrim [sulfamethoxazole-trimethoprim] Rash       Past Medical History:   Diagnosis Date    Asthma     GERD (gastroesophageal reflux disease)      Current Outpatient Medications   Medication Instructions    azelastine (ASTELIN) 137 mcg (0.1 %) nasal spray SMARTSI Spray(s) Both Nares Twice Daily PRN    esomeprazole (NEXIUM) 40 mg, Oral, 2 times daily  "   famotidine (PEPCID) 40 mg, Oral, Nightly    fluticasone propionate (FLONASE) 50 mcg/actuation nasal spray SMARTSI Spray(s) Both Nares Every Morning    neomycin-polymyxin-hydrocortisone (CORTISPORIN) 3.5-10,000-1 mg/mL-unit/mL-% otic suspension 4 drops, Right Ear, 4 times daily    SYMBICORT 80-4.5 mcg/actuation HFAA SMARTSI Puff(s) By Mouth Every 4-6 Hours PRN    TRI-SPRINTEC, 28, 0.18/0.215/0.25 mg-35 mcg (28) tablet 1 tablet, Oral, Daily    valACYclovir (VALTREX) 1,000 mg, Oral, 2 times daily     Past Surgical History:   Procedure Laterality Date    NASAL SEPTOPLASTY      OVARIAN CYST REMOVAL Right     UTERINE FIBROID SURGERY       Family History   Problem Relation Age of Onset    Hypotension Mother     Diabetes Father     Heart disease Father     Osteopenia Sister     Rheum arthritis Sister        Social History     Tobacco Use    Smoking status: Never Smoker    Smokeless tobacco: Never Used        Physical Exam      Vital Signs Reviewed   BP (!) 148/84 (BP Location: Left arm, Patient Position: Sitting, BP Method: Medium (Automatic))   Pulse 60   Temp 98.6 °F (37 °C) (Oral)   Resp 20   Ht 5' 5" (1.651 m)   Wt 75.4 kg (166 lb 3.2 oz)   LMP 2022 (Approximate)   SpO2 98%   BMI 27.66 kg/m²        Procedures    Procedures     Labs     Results for orders placed or performed in visit on 21   CBC Auto Differential   Result Value Ref Range    Abs Neut 4.74 2.10 - 9.20 x10(3)/mcL    Hgb 13.6 12.0 - 16.0 gm/dL    Hct 41.2 37.0 - 47.0 %    MCV 87.8 80.0 - 94.0 fL    MCH 29.0 27.0 - 31.0 pg    MCHC 33.0 33.0 - 36.0 gm/dL    MPV 10.0 9.4 - 12.4 fL    WBC 8.3 4.5 - 11.5 x10(3)/mcL    RBC 4.69 4.20 - 5.40 x10(6)/mcL    RDW 13.3 11.5 - 17.0 %    Platelet 261 130 - 400 x10(3)/mcL   Differential   Result Value Ref Range    Basophil % 0 %    Neut # 4.74 2.10 - 9.20 x10(3)/mcL    Lymph # 2.6 0.6 - 4.6 x10(3)/mcL    Mono # 0.7 0.1 - 1.3 x10(3)/mcL    Eos # 0.2 0.0 - 0.9 x10(3)/mcL    Baso # " 0.0 0.0 - 0.2 x10(3)/mcL    Neut % 57 %    Lymph % 31 %    Mono % 8 %    Eos % 2 %       Assessment:       1. Acute otitis externa of right ear, unspecified type    2. Herpes simplex          Plan:       Recommend Valtrex for fever blister.  Recommend Cortisporin drops to right ear for otitis externa.  Recommend follow-up with primary care physician in 5-7 days for re-evaluation if not improving.  Acute otitis externa of right ear, unspecified type  -     neomycin-polymyxin-hydrocortisone (CORTISPORIN) 3.5-10,000-1 mg/mL-unit/mL-% otic suspension; Place 4 drops into the right ear 4 (four) times daily. for 8 days  Dispense: 9 mL; Refill: 0    Herpes simplex  -     valACYclovir (VALTREX) 1000 MG tablet; Take 1 tablet (1,000 mg total) by mouth 2 (two) times daily. for 7 days  Dispense: 14 tablet; Refill: 0

## 2022-08-07 NOTE — PATIENT INSTRUCTIONS
Recommend Valtrex for fever blister.  Recommend Cortisporin drops to right ear for otitis externa.  Recommend follow-up with primary care physician in 5-7 days for re-evaluation if not improving.  
(0) independent

## 2022-09-19 ENCOUNTER — HISTORICAL (OUTPATIENT)
Dept: ADMINISTRATIVE | Facility: HOSPITAL | Age: 52
End: 2022-09-19
Payer: COMMERCIAL

## 2022-09-21 ENCOUNTER — HISTORICAL (OUTPATIENT)
Dept: ADMINISTRATIVE | Facility: HOSPITAL | Age: 52
End: 2022-09-21
Payer: COMMERCIAL

## 2022-10-25 ENCOUNTER — CLINICAL SUPPORT (OUTPATIENT)
Dept: OTHER | Facility: CLINIC | Age: 52
End: 2022-10-25
Payer: COMMERCIAL

## 2022-10-25 DIAGNOSIS — Z00.8 ENCOUNTER FOR OTHER GENERAL EXAMINATION: ICD-10-CM

## 2022-10-25 PROCEDURE — 82947 ASSAY GLUCOSE BLOOD QUANT: CPT | Mod: QW,S$GLB,, | Performed by: FAMILY MEDICINE

## 2022-10-25 PROCEDURE — 99211 OFF/OP EST MAY X REQ PHY/QHP: CPT | Mod: S$GLB,,, | Performed by: FAMILY MEDICINE

## 2022-10-25 PROCEDURE — 82947 PR  ASSAY QUANTITATIVE,BLOOD GLUCOSE: ICD-10-PCS | Mod: QW,S$GLB,, | Performed by: FAMILY MEDICINE

## 2022-10-25 PROCEDURE — 99211 PR OFFICE/OUTPT VISIT, EST, LEVL I: ICD-10-PCS | Mod: S$GLB,,, | Performed by: FAMILY MEDICINE

## 2022-10-25 PROCEDURE — 80061 PR  LIPID PANEL: ICD-10-PCS | Mod: QW,S$GLB,, | Performed by: FAMILY MEDICINE

## 2022-10-25 PROCEDURE — 80061 LIPID PANEL: CPT | Mod: QW,S$GLB,, | Performed by: FAMILY MEDICINE

## 2022-10-26 LAB
GLUCOSE SERPL-MCNC: 105 MG/DL (ref 60–140)
HDLC SERPL-MCNC: 55 MG/DL
POC CHOLESTEROL, LDL (DOCK): 101 MG/DL
POC CHOLESTEROL, TOTAL: 174 MG/DL
TRIGL SERPL-MCNC: 102 MG/DL

## 2022-10-29 VITALS — DIASTOLIC BLOOD PRESSURE: 83 MMHG | SYSTOLIC BLOOD PRESSURE: 130 MMHG

## 2022-11-15 ENCOUNTER — DOCUMENTATION ONLY (OUTPATIENT)
Dept: PRIMARY CARE CLINIC | Facility: CLINIC | Age: 52
End: 2022-11-15
Payer: COMMERCIAL

## 2023-01-27 ENCOUNTER — OFFICE VISIT (OUTPATIENT)
Dept: URGENT CARE | Facility: CLINIC | Age: 53
End: 2023-01-27
Payer: COMMERCIAL

## 2023-01-27 VITALS
HEART RATE: 66 BPM | SYSTOLIC BLOOD PRESSURE: 153 MMHG | HEIGHT: 65 IN | WEIGHT: 160 LBS | BODY MASS INDEX: 26.66 KG/M2 | OXYGEN SATURATION: 97 % | TEMPERATURE: 99 F | RESPIRATION RATE: 20 BRPM | DIASTOLIC BLOOD PRESSURE: 94 MMHG

## 2023-01-27 DIAGNOSIS — J06.9 VIRAL UPPER RESPIRATORY TRACT INFECTION: ICD-10-CM

## 2023-01-27 DIAGNOSIS — J02.9 SORE THROAT: Primary | ICD-10-CM

## 2023-01-27 LAB
CTP QC/QA: YES
FLUAV AG NPH QL: NEGATIVE
FLUBV AG NPH QL: NEGATIVE
S PYO RRNA THROAT QL PROBE: NEGATIVE
SARS-COV-2 RDRP RESP QL NAA+PROBE: NEGATIVE

## 2023-01-27 PROCEDURE — 1159F PR MEDICATION LIST DOCUMENTED IN MEDICAL RECORD: ICD-10-PCS | Mod: CPTII,,, | Performed by: NURSE PRACTITIONER

## 2023-01-27 PROCEDURE — 3077F PR MOST RECENT SYSTOLIC BLOOD PRESSURE >= 140 MM HG: ICD-10-PCS | Mod: CPTII,,, | Performed by: NURSE PRACTITIONER

## 2023-01-27 PROCEDURE — 87880 STREP A ASSAY W/OPTIC: CPT | Mod: QW,,, | Performed by: NURSE PRACTITIONER

## 2023-01-27 PROCEDURE — 87635: ICD-10-PCS | Mod: QW,,, | Performed by: NURSE PRACTITIONER

## 2023-01-27 PROCEDURE — 87804 POCT INFLUENZA A/B: ICD-10-PCS | Mod: 59,QW,, | Performed by: NURSE PRACTITIONER

## 2023-01-27 PROCEDURE — 1160F PR REVIEW ALL MEDS BY PRESCRIBER/CLIN PHARMACIST DOCUMENTED: ICD-10-PCS | Mod: CPTII,,, | Performed by: NURSE PRACTITIONER

## 2023-01-27 PROCEDURE — 1159F MED LIST DOCD IN RCRD: CPT | Mod: CPTII,,, | Performed by: NURSE PRACTITIONER

## 2023-01-27 PROCEDURE — 87635 SARS-COV-2 COVID-19 AMP PRB: CPT | Mod: QW,,, | Performed by: NURSE PRACTITIONER

## 2023-01-27 PROCEDURE — 3080F DIAST BP >= 90 MM HG: CPT | Mod: CPTII,,, | Performed by: NURSE PRACTITIONER

## 2023-01-27 PROCEDURE — 87804 INFLUENZA ASSAY W/OPTIC: CPT | Mod: 59,QW,, | Performed by: NURSE PRACTITIONER

## 2023-01-27 PROCEDURE — 99214 OFFICE O/P EST MOD 30 MIN: CPT | Mod: ,,, | Performed by: NURSE PRACTITIONER

## 2023-01-27 PROCEDURE — 1160F RVW MEDS BY RX/DR IN RCRD: CPT | Mod: CPTII,,, | Performed by: NURSE PRACTITIONER

## 2023-01-27 PROCEDURE — 3077F SYST BP >= 140 MM HG: CPT | Mod: CPTII,,, | Performed by: NURSE PRACTITIONER

## 2023-01-27 PROCEDURE — 3008F PR BODY MASS INDEX (BMI) DOCUMENTED: ICD-10-PCS | Mod: CPTII,,, | Performed by: NURSE PRACTITIONER

## 2023-01-27 PROCEDURE — 3080F PR MOST RECENT DIASTOLIC BLOOD PRESSURE >= 90 MM HG: ICD-10-PCS | Mod: CPTII,,, | Performed by: NURSE PRACTITIONER

## 2023-01-27 PROCEDURE — 3008F BODY MASS INDEX DOCD: CPT | Mod: CPTII,,, | Performed by: NURSE PRACTITIONER

## 2023-01-27 PROCEDURE — 87880 POCT RAPID STREP A: ICD-10-PCS | Mod: QW,,, | Performed by: NURSE PRACTITIONER

## 2023-01-27 PROCEDURE — 99214 PR OFFICE/OUTPT VISIT, EST, LEVL IV, 30-39 MIN: ICD-10-PCS | Mod: ,,, | Performed by: NURSE PRACTITIONER

## 2023-01-27 RX ORDER — METHYLPREDNISOLONE 4 MG/1
TABLET ORAL
Qty: 21 EACH | Refills: 0 | Status: SHIPPED | OUTPATIENT
Start: 2023-01-27 | End: 2023-04-10

## 2023-01-27 NOTE — LETTER
January 27, 2023      [facility]  [address]  [place] [address]  Phone: [phone]       Patient: Camille Beck   YOB: 1970  Date of Visit: 01/27/2023    To Whom It May Concern:    Laura Beck  was at [facility] on 01/27/2023. The patient may return to work/school on 01/28/2023 with no restrictions. If you have any questions or concerns, or if I can be of further assistance, please do not hesitate to contact me.    Sincerely,    Mony Scott LPN

## 2023-01-27 NOTE — PROGRESS NOTES
"Subjective:       Patient ID: Camille Beck is a 52 y.o. female.    Vitals:  height is 5' 5" (1.651 m) and weight is 72.6 kg (160 lb). Her oral temperature is 98.7 °F (37.1 °C). Her blood pressure is 153/94 (abnormal) and her pulse is 66. Her respiration is 20 and oxygen saturation is 97%.     Chief Complaint: Sore Throat (Sore throat, ear ache, headache, runny nose x 4 days )    52-year-old female presents with sore throat, sinus congestion, ear discomfort, onset 2 days ago.  No shortness of breath or fever    HENT:  Positive for congestion and sore throat.    Respiratory:  Positive for cough.      Objective:      Physical Exam   Constitutional: She is oriented to person, place, and time. She appears well-developed. She is cooperative.  Non-toxic appearance. She does not appear ill. No distress.   HENT:   Head: Normocephalic and atraumatic.   Ears:   Right Ear: Hearing, tympanic membrane, external ear and ear canal normal.   Left Ear: Hearing, tympanic membrane, external ear and ear canal normal.   Nose: Nose normal. No mucosal edema, rhinorrhea or nasal deformity. No epistaxis. Right sinus exhibits no maxillary sinus tenderness and no frontal sinus tenderness. Left sinus exhibits no maxillary sinus tenderness and no frontal sinus tenderness.   Mouth/Throat: Uvula is midline, oropharynx is clear and moist and mucous membranes are normal. No trismus in the jaw. Normal dentition. No uvula swelling. No oropharyngeal exudate, posterior oropharyngeal edema or posterior oropharyngeal erythema.   Eyes: Conjunctivae and lids are normal. No scleral icterus.   Neck: Trachea normal and phonation normal. Neck supple. No edema present. No erythema present. No neck rigidity present.   Cardiovascular: Normal rate, regular rhythm, normal heart sounds and normal pulses.   Pulmonary/Chest: Effort normal and breath sounds normal. No respiratory distress. She has no decreased breath sounds. She has no rhonchi.   Abdominal: Normal " appearance.   Musculoskeletal: Normal range of motion.         General: No deformity. Normal range of motion.   Neurological: She is alert and oriented to person, place, and time. She exhibits normal muscle tone. Coordination normal.   Skin: Skin is warm, dry, intact, not diaphoretic and not pale.   Psychiatric: Her speech is normal and behavior is normal. Judgment and thought content normal.   Nursing note and vitals reviewed.      Assessment:       1. Sore throat    2. Viral upper respiratory tract infection          Office Visit on 01/27/2023   Component Date Value Ref Range Status    POC Rapid COVID 01/27/2023 Negative  Negative Final     Acceptable 01/27/2023 Yes   Final    Rapid Strep A Screen 01/27/2023 Negative  Negative Final     Acceptable 01/27/2023 Yes   Final    Rapid Influenza A Ag 01/27/2023 Negative  Negative Final    Rapid Influenza B Ag 01/27/2023 Negative  Negative Final     Acceptable 01/27/2023 Yes   Final        Plan:     Mucinex OTC as directed for cough  Nasal saline, Claritin OTC as directed  Take prescription Medrol Dosepak as directed  Increase oral fluids  Ibuprofen or Tylenol as directed for pain or fever  Please follow up with your primary care provider within 2-5 days if your signs and symptoms have not resolved or worsen.      Sore throat  -     POCT COVID-19 Rapid Screening  -     POCT rapid strep A  -     POCT Influenza A/B    Viral upper respiratory tract infection  -     methylPREDNISolone (MEDROL DOSEPACK) 4 mg tablet; use as directed  Dispense: 21 each; Refill: 0

## 2023-01-27 NOTE — PATIENT INSTRUCTIONS
Mucinex OTC as directed for cough  Nasal saline, Claritin OTC as directed  Take prescription Medrol Dosepak as directed  Increase oral fluids  Ibuprofen or Tylenol as directed for pain or fever  Please follow up with your primary care provider within 2-5 days if your signs and symptoms have not resolved or worsen.

## 2023-02-03 NOTE — PROGRESS NOTES
PCP STATUS: Has PCP  NOTES:  -- AUGUSTINE Collins    Onsite consult was completed. Screening results and educational material were sent to the participant.

## 2023-04-10 PROBLEM — J06.9 VIRAL UPPER RESPIRATORY TRACT INFECTION: Status: RESOLVED | Noted: 2023-01-27 | Resolved: 2023-04-10

## 2023-07-18 PROBLEM — J30.1 NON-SEASONAL ALLERGIC RHINITIS DUE TO POLLEN: Status: ACTIVE | Noted: 2023-07-18

## 2023-07-18 PROBLEM — K21.9 GASTROESOPHAGEAL REFLUX DISEASE WITHOUT ESOPHAGITIS: Status: ACTIVE | Noted: 2023-07-18
